# Patient Record
(demographics unavailable — no encounter records)

---

## 2017-03-15 NOTE — MM
Reason for exam: screening  (asymptomatic).

Last mammogram was performed 1 year and 1 month ago.



History:

Patient has history of endometrial cancer at age 32.

Family history of breast cancer in cousin at age 35.

Took hormonal contraceptives for 10 years beginning at age 20.  Taking estrogen 

for 3 years beginning at age 43.



Physical Findings:

A clinical breast exam by your physician is recommended on an annual basis and 

results should be correlated with mammographic findings.



MG Screening Mammo w CAD

Bilateral CC and MLO view(s) were taken.

Prior study comparison: February 24, 2016, bilateral MG screening mammo w CAD.  

January 28, 2015, bilateral MG screening mammo w CAD.

There are scattered fibroglandular densities.  There is chronic nodularity 

bilaterally. There is no discrete abnormality.





ASSESSMENT: Benign, BI-RAD 2



RECOMMENDATION:

Routine screening mammogram of both breasts in 1 year.

## 2018-04-03 NOTE — MM
Reason for exam: screening  (asymptomatic).

Last mammogram was performed 1 year and 1 month ago.



History:

Patient has history of endometrial cancer at age 32.

Family history of breast cancer in cousin at age 35.

Took hormonal contraceptives for 10 years beginning at age 20.  Taking estrogen 

for 3 years beginning at age 43.



Physical Findings:

A clinical breast exam by your physician is recommended on an annual basis and 

results should be correlated with mammographic findings.



MG Screening Mammo w CAD

Bilateral CC and MLO view(s) were taken.

Prior study comparison: March 14, 2017, bilateral MG screening mammo w CAD.  

February 24, 2016, bilateral MG screening mammo w CAD.

There are scattered fibroglandular densities.  No suspicious abnormality.  No 

significant changes when compared with prior studies.





ASSESSMENT: Negative, BI-RAD 1



RECOMMENDATION:

Routine screening mammogram of both breasts in 1 year.

## 2019-04-10 NOTE — MM
Reason for exam: screening  (asymptomatic).

Last mammogram was performed 1 year ago.



History:

Patient has history of endometrial cancer at age 32.

Family history of breast cancer in cousin at age 35.

Took hormonal contraceptives for 10 years beginning at age 20.  Taking estrogen 

for 3 years beginning at age 43.



Physical Findings:

A clinical breast exam by your physician is recommended on an annual basis and 

results should be correlated with mammographic findings.



MG Screening Mammo w CAD

Bilateral CC and MLO view(s) were taken.

Prior study comparison: April 2, 2018, bilateral MG screening mammo w CAD.  March 14, 2017, bilateral MG screening mammo w CAD.

There are scattered fibroglandular densities.  No suspicious abnormality.  No 

significant changes when compared with prior studies.





ASSESSMENT: Negative, BI-RAD 1



RECOMMENDATION:

Routine screening mammogram of both breasts in 1 year.

## 2020-10-08 NOTE — MM
Reason for exam: screening  (asymptomatic).

Last mammogram was performed 1 year and 6 months ago.



History:

Patient has history of endometrial cancer at age 32.

Family history of breast cancer in cousin at age 35.

Took hormonal contraceptives for 10 years beginning at age 20.  Taking estrogen 

for 7 years beginning at age 43.



Physical Findings:

A clinical breast exam by your physician is recommended on an annual basis and 

results should be correlated with mammographic findings.



MG Screening Mammo w CAD

Bilateral CC and MLO view(s) were taken.

Prior study comparison: April 8, 2019, bilateral MG screening mammo w CAD.  April 2, 2018, bilateral MG screening mammo w CAD.

There are scattered fibroglandular densities.  There is chronic nodularity 

bilaterally axilla. There is no discrete abnormality.





ASSESSMENT: Benign, BI-RAD 2



RECOMMENDATION:

Routine screening mammogram of both breasts in 1 year.